# Patient Record
Sex: FEMALE | Race: ASIAN | URBAN - METROPOLITAN AREA
[De-identification: names, ages, dates, MRNs, and addresses within clinical notes are randomized per-mention and may not be internally consistent; named-entity substitution may affect disease eponyms.]

---

## 2020-08-05 ENCOUNTER — TELEPHONE ENCOUNTER (OUTPATIENT)
Dept: URBAN - METROPOLITAN AREA CLINIC 92 | Facility: CLINIC | Age: 40
End: 2020-08-05

## 2020-08-07 LAB
A/G RATIO: 1.9
ALBUMIN: 4.5
ALKALINE PHOSPHATASE: 36
ALT (SGPT): 15
AST (SGOT): 14
BILIRUBIN, TOTAL: 0.5
BUN/CREATININE RATIO: 19
BUN: 10
CALCIUM: 8.9
CARBON DIOXIDE, TOTAL: 25
CHLORIDE: 100
CHOLESTEROL, TOTAL: 184
COMMENT:: (no result)
CREATININE: 0.54
EGFR IF AFRICN AM: 139
EGFR IF NONAFRICN AM: 121
GLOBULIN, TOTAL: 2.4
GLUCOSE: 105
HDL CHOLESTEROL: 43
HEMATOCRIT: 45.6
HEMOGLOBIN: 15
LDL CHOLESTEROL CALC: 116
MCH: 29.8
MCHC: 32.9
MCV: 91
NRBC: (no result)
PLATELETS: 323
POTASSIUM: 4.3
PROTEIN, TOTAL: 6.9
RBC: 5.03
RDW: 12.6
SODIUM: 138
TRIGLYCERIDES: 123
VLDL CHOLESTEROL CAL: 25
WBC: 6.6

## 2020-08-13 ENCOUNTER — OFFICE VISIT (OUTPATIENT)
Dept: URBAN - METROPOLITAN AREA CLINIC 115 | Facility: CLINIC | Age: 40
End: 2020-08-13
Payer: OTHER GOVERNMENT

## 2020-08-13 DIAGNOSIS — K76.0 FATTY (CHANGE OF) LIVER, NOT ELSEWHERE CLASSIFIED: ICD-10-CM

## 2020-08-13 DIAGNOSIS — R74.8 ELEVATED LIVER ENZYMES: ICD-10-CM

## 2020-08-13 PROCEDURE — 99214 OFFICE O/P EST MOD 30 MIN: CPT | Performed by: INTERNAL MEDICINE

## 2020-08-13 PROCEDURE — G8427 DOCREV CUR MEDS BY ELIG CLIN: HCPCS | Performed by: INTERNAL MEDICINE

## 2020-08-13 PROCEDURE — G8418 CALC BMI BLW LOW PARAM F/U: HCPCS | Performed by: INTERNAL MEDICINE

## 2020-08-13 PROCEDURE — 1036F TOBACCO NON-USER: CPT | Performed by: INTERNAL MEDICINE

## 2020-08-13 NOTE — HPI-TODAY'S VISIT:
08/13/2020 Patient states she is doing well. Labs showed liver enzymes are normal, cholesterol is slightly high. She states she cut down on oil and has been eating better. She stays active all day, especially with her three boys. She takes Vitamin C, and would like to continue Vitamin E as it helps with varicose veins.

## 2020-08-13 NOTE — HPI-OTHER HISTORIES
The patient is a 39 year old  female, who presents on referral from Keron Hanley MD, for a gastroenterology evaluation for elevated liver enzymes. A copy of this document will be sent to the referring provider. Recent liver function tests, ALT, are elevated (see labs which were reviewed in the patients records ). The patient reports no significant symptoms related to the elevated LFT's.  The patient relates no significant family or personal history of liver disease. She states a history of laxative. The patient reports a personal history of no other habits that could cause liver damage.  Interval testing has not been done.     08/30/2018 ALT is 39, Hepatitis profile is negative. Patient does not have history of liver issues. Patient just recently had baby. Patient had C section, states she had constipation. States she took laxative. Patient admits to rash all over body. Rash is gone now. Patient occasionally drinks wine. States she is currently taking multivitamins. Denies jaundice and ascites.  10/10/2018 Patient admits to infraumbilical abdomina pain in the past believed to be related to C section. Denies current alcohol consumption. States her normal weight is around 100 pounds.  12/05/2018 Patient is doing well. 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 No jaundice or back pains.  1/31/19 Liver biopsy on 1/17/19 shows moderate fatty liver with no inflammation or fibrosis. Patient denies any new symptoms since last visit. Patient admits to weight loss of 8 pounds over past few months. Labs from last visit showed cholesterol level was normal. Denies personal history of thyroid or arthritis, no family history of autoimune diseases. Patient no longer takes vitamin supplementation. No joint pains or back pains.  8/8/2019 Labs drawn on 1/31/2019 were normal, liver enzymes are normal.  Patient denies any new symptoms. She denies any abdominal pain or bloating. She states recently she is unable to sleep at night. She does admit to stress. She has been exercising and maintaining her weight. She is currently on vitamin supplements.  02/13/2020 Patient denies any new symptoms since the previous visit. She is currently taking multivitamin and B12 supplements. She denies any new medications. Denies any abdominal pain, no jaundice, ascites or pedal edema. She does not take Vitamin E. She reports that she always eats fish in her diet. She also reports exercise by walking daily.

## 2021-01-04 ENCOUNTER — TELEPHONE ENCOUNTER (OUTPATIENT)
Dept: URBAN - METROPOLITAN AREA CLINIC 92 | Facility: CLINIC | Age: 41
End: 2021-01-04

## 2021-01-05 ENCOUNTER — LAB OUTSIDE AN ENCOUNTER (OUTPATIENT)
Dept: URBAN - METROPOLITAN AREA CLINIC 82 | Facility: CLINIC | Age: 41
End: 2021-01-05

## 2021-01-06 LAB
A/G RATIO: 1.8
ALBUMIN: 4.5
ALKALINE PHOSPHATASE: 40
ALT (SGPT): 14
AST (SGOT): 11
BILIRUBIN, TOTAL: 0.4
BUN/CREATININE RATIO: 16
BUN: 9
CALCIUM: 9.1
CARBON DIOXIDE, TOTAL: 24
CHLORIDE: 103
CHOLESTEROL, TOTAL: 167
COMMENT:: (no result)
CREATININE: 0.56
EGFR IF AFRICN AM: 137
EGFR IF NONAFRICN AM: 119
GLOBULIN, TOTAL: 2.5
GLUCOSE: 106
HDL CHOLESTEROL: 41
HEMATOCRIT: 46.6
HEMOGLOBIN: 14.6
LDL CHOL CALC (NIH): 103
MCH: 29.6
MCHC: 31.3
MCV: 94
NRBC: (no result)
PLATELETS: 357
POTASSIUM: 4.5
PROTEIN, TOTAL: 7
RBC: 4.94
RDW: 12.6
SODIUM: 138
TRIGLYCERIDES: 126
VLDL CHOLESTEROL CAL: 23
WBC: 7.3

## 2021-01-14 ENCOUNTER — WEB ENCOUNTER (OUTPATIENT)
Dept: URBAN - METROPOLITAN AREA CLINIC 115 | Facility: CLINIC | Age: 41
End: 2021-01-14

## 2021-01-14 ENCOUNTER — OFFICE VISIT (OUTPATIENT)
Dept: URBAN - METROPOLITAN AREA CLINIC 115 | Facility: CLINIC | Age: 41
End: 2021-01-14
Payer: OTHER GOVERNMENT

## 2021-01-14 DIAGNOSIS — K76.0 FATTY (CHANGE OF) LIVER, NOT ELSEWHERE CLASSIFIED: ICD-10-CM

## 2021-01-14 DIAGNOSIS — E78.2 MIXED HYPERLIPIDEMIA: ICD-10-CM

## 2021-01-14 DIAGNOSIS — R74.8 ELEVATED LIVER ENZYMES: ICD-10-CM

## 2021-01-14 PROCEDURE — G8427 DOCREV CUR MEDS BY ELIG CLIN: HCPCS | Performed by: INTERNAL MEDICINE

## 2021-01-14 PROCEDURE — G8418 CALC BMI BLW LOW PARAM F/U: HCPCS | Performed by: INTERNAL MEDICINE

## 2021-01-14 PROCEDURE — G9903 PT SCRN TBCO ID AS NON USER: HCPCS | Performed by: INTERNAL MEDICINE

## 2021-01-14 PROCEDURE — 1036F TOBACCO NON-USER: CPT | Performed by: INTERNAL MEDICINE

## 2021-01-14 PROCEDURE — 99214 OFFICE O/P EST MOD 30 MIN: CPT | Performed by: INTERNAL MEDICINE

## 2021-01-14 NOTE — HPI-TODAY'S VISIT:
08/13/2020 Patient states she is doing well. Labs showed liver enzymes are normal, cholesterol is slightly high. She states she cut down on oil and has been eating better. She stays active all day, especially with her three boys. She takes Vitamin C, and would like to continue Vitamin E as it helps with varicose veins.  01/14/2021 Patient presents for a f/u office visit. Labs show LDL cholesterol is 103, slightly high. Liver enzymes are normal. Patient states she is not experiencing abdominal pains anymore. She is also trying to exercise regularly, although quarantine has made it more difficult. No issues with heartburn or acid reflux. She is taking vitamin E, vitamin C, and multivitamin, but no other natural supplements. No hepatomegaly noted in physical examination.

## 2021-02-13 ENCOUNTER — LAB OUTSIDE AN ENCOUNTER (OUTPATIENT)
Dept: URBAN - METROPOLITAN AREA CLINIC 115 | Facility: CLINIC | Age: 41
End: 2021-02-13

## 2022-01-05 LAB
A/G RATIO: 2
ALBUMIN: 4.5
ALKALINE PHOSPHATASE: 40
ALT (SGPT): 14
AST (SGOT): 14
BILIRUBIN, TOTAL: 0.6
BUN/CREATININE RATIO: 17
BUN: 10
CALCIUM: 8.8
CARBON DIOXIDE, TOTAL: 23
CHLORIDE: 103
CHOLESTEROL, TOTAL: 196
COMMENT:: (no result)
CREATININE: 0.58
EGFR IF AFRICN AM: 134
EGFR IF NONAFRICN AM: 116
GLOBULIN, TOTAL: 2.3
GLUCOSE: 109
HDL CHOLESTEROL: 38
HEMATOCRIT: 43.6
HEMOGLOBIN: 14.6
LDL CHOL CALC (NIH): 138
MCH: 30.3
MCHC: 33.5
MCV: 91
NRBC: (no result)
PLATELETS: 328
POTASSIUM: 4.3
PROTEIN, TOTAL: 6.8
RBC: 4.82
RDW: 12.7
SODIUM: 136
TRIGLYCERIDES: 108
VLDL CHOLESTEROL CAL: 20
WBC: 4.6

## 2022-01-13 ENCOUNTER — OFFICE VISIT (OUTPATIENT)
Dept: URBAN - METROPOLITAN AREA CLINIC 115 | Facility: CLINIC | Age: 42
End: 2022-01-13
Payer: OTHER GOVERNMENT

## 2022-01-13 ENCOUNTER — WEB ENCOUNTER (OUTPATIENT)
Dept: URBAN - METROPOLITAN AREA CLINIC 115 | Facility: CLINIC | Age: 42
End: 2022-01-13

## 2022-01-13 DIAGNOSIS — E78.2 MIXED HYPERLIPIDEMIA: ICD-10-CM

## 2022-01-13 DIAGNOSIS — R74.8 ELEVATED LIVER ENZYMES: ICD-10-CM

## 2022-01-13 DIAGNOSIS — K76.0 FATTY (CHANGE OF) LIVER, NOT ELSEWHERE CLASSIFIED: ICD-10-CM

## 2022-01-13 PROCEDURE — G9903 PT SCRN TBCO ID AS NON USER: HCPCS | Performed by: INTERNAL MEDICINE

## 2022-01-13 PROCEDURE — 99214 OFFICE O/P EST MOD 30 MIN: CPT | Performed by: INTERNAL MEDICINE

## 2022-01-13 PROCEDURE — G8427 DOCREV CUR MEDS BY ELIG CLIN: HCPCS | Performed by: INTERNAL MEDICINE

## 2022-01-13 PROCEDURE — G8418 CALC BMI BLW LOW PARAM F/U: HCPCS | Performed by: INTERNAL MEDICINE

## 2022-01-13 NOTE — HPI-TODAY'S VISIT:
08/13/2020 Patient states she is doing well. Labs showed liver enzymes are normal, cholesterol is slightly high. She states she cut down on oil and has been eating better. She stays active all day, especially with her three boys. She takes Vitamin C, and would like to continue Vitamin E as it helps with varicose veins.  01/14/2021 Patient presents for a f/u office visit. Labs show LDL cholesterol is 103, slightly high. Liver enzymes are normal. Patient states she is not experiencing abdominal pains anymore. She is also trying to exercise regularly, although quarantine has made it more difficult. No issues with heartburn or acid reflux. She is taking vitamin E, vitamin C, and multivitamin, but no other natural supplements. No hepatomegaly noted in physical examination.  01/13/2022 Patient presents for follow up. Labs on 1/4/2022 showed normal liver enzymes, normal triglycerides, slightly high cholesterol. Patient denies any GI complaints for the past year. Denies any constipation, diarrhea, or bloating. She is not on any cholesterol medication. No changes to medical history since last year. No history of thyroid conditions. Patient denies any family history of cardiac pathology or hyperlipidemia.

## 2022-01-20 ENCOUNTER — OFFICE VISIT (OUTPATIENT)
Dept: URBAN - METROPOLITAN AREA CLINIC 114 | Facility: CLINIC | Age: 42
End: 2022-01-20
Payer: OTHER GOVERNMENT

## 2022-01-20 DIAGNOSIS — K76.0 HEPATIC STEATOSIS: ICD-10-CM

## 2022-01-20 PROCEDURE — 76705 ECHO EXAM OF ABDOMEN: CPT | Performed by: INTERNAL MEDICINE

## 2022-01-26 ENCOUNTER — TELEPHONE ENCOUNTER (OUTPATIENT)
Dept: URBAN - METROPOLITAN AREA CLINIC 115 | Facility: CLINIC | Age: 42
End: 2022-01-26

## 2022-03-24 ENCOUNTER — WEB ENCOUNTER (OUTPATIENT)
Dept: URBAN - METROPOLITAN AREA CLINIC 115 | Facility: CLINIC | Age: 42
End: 2022-03-24

## 2022-03-24 ENCOUNTER — OFFICE VISIT (OUTPATIENT)
Dept: URBAN - METROPOLITAN AREA CLINIC 115 | Facility: CLINIC | Age: 42
End: 2022-03-24
Payer: OTHER GOVERNMENT

## 2022-03-24 DIAGNOSIS — R74.8 ELEVATED LIVER ENZYMES: ICD-10-CM

## 2022-03-24 DIAGNOSIS — K64.8 BLEEDING INTERNAL HEMORRHOIDS: ICD-10-CM

## 2022-03-24 DIAGNOSIS — E78.2 MIXED HYPERLIPIDEMIA: ICD-10-CM

## 2022-03-24 DIAGNOSIS — K76.0 FATTY (CHANGE OF) LIVER, NOT ELSEWHERE CLASSIFIED: ICD-10-CM

## 2022-03-24 PROCEDURE — 1036F TOBACCO NON-USER: CPT | Performed by: INTERNAL MEDICINE

## 2022-03-24 PROCEDURE — G9903 PT SCRN TBCO ID AS NON USER: HCPCS | Performed by: INTERNAL MEDICINE

## 2022-03-24 PROCEDURE — 46600 DIAGNOSTIC ANOSCOPY SPX: CPT | Performed by: INTERNAL MEDICINE

## 2022-03-24 PROCEDURE — 99214 OFFICE O/P EST MOD 30 MIN: CPT | Performed by: INTERNAL MEDICINE

## 2022-03-24 PROCEDURE — G8427 DOCREV CUR MEDS BY ELIG CLIN: HCPCS | Performed by: INTERNAL MEDICINE

## 2022-03-24 RX ORDER — HYDROCORTISONE ACETATE 25 MG/1
1 SUPPOSITORY SUPPOSITORY RECTAL ONCE A DAY
Qty: 14 | Refills: 1 | OUTPATIENT
Start: 2022-03-24 | End: 2022-04-21

## 2022-03-24 RX ORDER — HYDROCORTISONE 2.5% 25 MG/G
1 APPLICATION CREAM TOPICAL TWICE A DAY
Qty: 60 GRAMS | Refills: 1 | OUTPATIENT
Start: 2022-03-24 | End: 2022-04-21

## 2022-03-24 NOTE — HPI-TODAY'S VISIT:
08/13/2020 Patient states she is doing well. Labs showed liver enzymes are normal, cholesterol is slightly high. She states she cut down on oil and has been eating better. She stays active all day, especially with her three boys. She takes Vitamin C, and would like to continue Vitamin E as it helps with varicose veins.  01/14/2021 Patient presents for a f/u office visit. Labs show LDL cholesterol is 103, slightly high. Liver enzymes are normal. Patient states she is not experiencing abdominal pains anymore. She is also trying to exercise regularly, although quarantine has made it more difficult. No issues with heartburn or acid reflux. She is taking vitamin E, vitamin C, and multivitamin, but no other natural supplements. No hepatomegaly noted in physical examination.  01/13/2022 Patient presents for follow up. Labs on 1/4/2022 showed normal liver enzymes, normal triglycerides, slightly high cholesterol. Patient denies any GI complaints for the past year. Denies any constipation, diarrhea, or bloating. She is not on any cholesterol medication. No changes to medical history since last year. No history of thyroid conditions. Patient denies any family history of cardiac pathology or hyperlipidemia.   3/24/2022 Patient presents today for GI evaluation of hemorrhoids. She reports one episode of rectal pain but denies any rectal bleeding. She admits straining during bowel movements. No known family history of colon cancer or polyps. RUQ US on 1/20/2022 showed fatty liver.

## 2022-03-26 LAB
A/G RATIO: 1.8
ALBUMIN: 4.4
ALKALINE PHOSPHATASE: 42
ALT (SGPT): 19
AST (SGOT): 18
BASO (ABSOLUTE): 0.1
BASOS: 1
BILIRUBIN, TOTAL: 0.5
BUN/CREATININE RATIO: 13
BUN: 7
CALCIUM: 8.8
CARBON DIOXIDE, TOTAL: 24
CHLORIDE: 103
CHOLESTEROL, TOTAL: 159
COMMENT:: (no result)
CREATININE: 0.53
EGFR: 121
EOS (ABSOLUTE): 0.2
EOS: 4
GLOBULIN, TOTAL: 2.5
GLUCOSE: 90
HDL CHOLESTEROL: 36
HEMATOCRIT: 45.3
HEMATOLOGY COMMENTS:: (no result)
HEMOGLOBIN: 14.9
IMMATURE CELLS: (no result)
IMMATURE GRANS (ABS): 0
IMMATURE GRANULOCYTES: 1
LDL CHOL CALC (NIH): 105
LYMPHS (ABSOLUTE): 0.9
LYMPHS: 21
MCH: 30
MCHC: 32.9
MCV: 91
MONOCYTES(ABSOLUTE): 0.5
MONOCYTES: 12
NEUTROPHILS (ABSOLUTE): 2.6
NEUTROPHILS: 61
NRBC: (no result)
PLATELETS: 328
POTASSIUM: 4.5
PROTEIN, TOTAL: 6.9
RBC: 4.96
RDW: 12.5
SODIUM: 139
TRIGLYCERIDES: 96
VLDL CHOLESTEROL CAL: 18
WBC: 4.2

## 2022-04-01 ENCOUNTER — TELEPHONE ENCOUNTER (OUTPATIENT)
Dept: URBAN - METROPOLITAN AREA CLINIC 115 | Facility: CLINIC | Age: 42
End: 2022-04-01

## 2022-05-06 ENCOUNTER — TELEPHONE ENCOUNTER (OUTPATIENT)
Dept: URBAN - METROPOLITAN AREA CLINIC 115 | Facility: CLINIC | Age: 42
End: 2022-05-06

## 2022-05-26 ENCOUNTER — OFFICE VISIT (OUTPATIENT)
Dept: URBAN - METROPOLITAN AREA CLINIC 115 | Facility: CLINIC | Age: 42
End: 2022-05-26
Payer: OTHER GOVERNMENT

## 2022-05-26 DIAGNOSIS — R74.8 ELEVATED LIVER ENZYMES: ICD-10-CM

## 2022-05-26 DIAGNOSIS — E78.2 MIXED HYPERLIPIDEMIA: ICD-10-CM

## 2022-05-26 DIAGNOSIS — K76.0 FATTY (CHANGE OF) LIVER, NOT ELSEWHERE CLASSIFIED: ICD-10-CM

## 2022-05-26 DIAGNOSIS — K64.9 HEMORRHOIDS: ICD-10-CM

## 2022-05-26 PROCEDURE — G8418 CALC BMI BLW LOW PARAM F/U: HCPCS | Performed by: INTERNAL MEDICINE

## 2022-05-26 PROCEDURE — G9903 PT SCRN TBCO ID AS NON USER: HCPCS | Performed by: INTERNAL MEDICINE

## 2022-05-26 PROCEDURE — 99214 OFFICE O/P EST MOD 30 MIN: CPT | Performed by: INTERNAL MEDICINE

## 2022-05-26 PROCEDURE — G8427 DOCREV CUR MEDS BY ELIG CLIN: HCPCS | Performed by: INTERNAL MEDICINE

## 2022-05-26 NOTE — PHYSICAL EXAM SKIN:
rashes noted on arms and legs, no areas of discoloration , no jaundice present , good turgor , no masses , no tenderness on palpation

## 2022-05-26 NOTE — HPI-TODAY'S VISIT:
08/13/2020 Patient states she is doing well. Labs showed liver enzymes are normal, cholesterol is slightly high. She states she cut down on oil and has been eating better. She stays active all day, especially with her three boys. She takes Vitamin C, and would like to continue Vitamin E as it helps with varicose veins.  01/14/2021 Patient presents for a f/u office visit. Labs show LDL cholesterol is 103, slightly high. Liver enzymes are normal. Patient states she is not experiencing abdominal pains anymore. She is also trying to exercise regularly, although quarantine has made it more difficult. No issues with heartburn or acid reflux. She is taking vitamin E, vitamin C, and multivitamin, but no other natural supplements. No hepatomegaly noted in physical examination.  01/13/2022 Patient presents for follow up. Labs on 1/4/2022 showed normal liver enzymes, normal triglycerides, slightly high cholesterol. Patient denies any GI complaints for the past year. Denies any constipation, diarrhea, or bloating. She is not on any cholesterol medication. No changes to medical history since last year. No history of thyroid conditions. Patient denies any family history of cardiac pathology or hyperlipidemia.   3/24/2022 Patient presents today for GI evaluation of hemorrhoids. She reports one episode of rectal pain but denies any rectal bleeding. She admits straining during bowel movements. No known family history of colon cancer or polyps. RUQ US on 1/20/2022 showed fatty liver.  5/26/2022 Patient presents for a follow up office visit. Labs on 3/25/2022 showed mildly low HDL cholesterol, mildly elevated LDL cholesterol, normal AST and ALT. Patient denies any active GI symptoms. She denies any alcohol consumption. She does report skin rash on arms and legs from poison ivy. She is taking ointment from her PCP Dr. Hanley.

## 2022-05-27 LAB
A/G RATIO: 1.9
ALBUMIN: 4.5
ALKALINE PHOSPHATASE: 42
ALT (SGPT): 16
AST (SGOT): 14
BASO (ABSOLUTE): 0.1
BASOS: 2
BILIRUBIN, TOTAL: 0.7
BUN/CREATININE RATIO: 18
BUN: 10
CALCIUM: 9
CARBON DIOXIDE, TOTAL: 22
CHLORIDE: 105
CHOLESTEROL, TOTAL: 155
COMMENT:: (no result)
CREATININE: 0.57
EGFR: 118
EOS (ABSOLUTE): 0.5
EOS: 8
GLOBULIN, TOTAL: 2.4
GLUCOSE: 97
HDL CHOLESTEROL: 43
HEMATOCRIT: 46
HEMATOLOGY COMMENTS:: (no result)
HEMOGLOBIN: 14.5
IMMATURE CELLS: (no result)
IMMATURE GRANS (ABS): 0.1
IMMATURE GRANULOCYTES: 1
LDL CHOL CALC (NIH): 102
LYMPHS (ABSOLUTE): 1.2
LYMPHS: 20
MCH: 28.9
MCHC: 31.5
MCV: 92
MONOCYTES(ABSOLUTE): 0.6
MONOCYTES: 9
NEUTROPHILS (ABSOLUTE): 3.8
NEUTROPHILS: 60
NRBC: (no result)
PLATELETS: 339
POTASSIUM: 4.1
PROTEIN, TOTAL: 6.9
RBC: 5.01
RDW: 13
SODIUM: 140
TRIGLYCERIDES: 49
VLDL CHOLESTEROL CAL: 10
WBC: 6.2

## 2022-07-01 ENCOUNTER — LAB OUTSIDE AN ENCOUNTER (OUTPATIENT)
Dept: URBAN - METROPOLITAN AREA CLINIC 115 | Facility: CLINIC | Age: 42
End: 2022-07-01

## 2022-07-13 ENCOUNTER — LAB OUTSIDE AN ENCOUNTER (OUTPATIENT)
Dept: URBAN - METROPOLITAN AREA CLINIC 115 | Facility: CLINIC | Age: 42
End: 2022-07-13

## 2022-11-03 ENCOUNTER — OFFICE VISIT (OUTPATIENT)
Dept: URBAN - METROPOLITAN AREA CLINIC 115 | Facility: CLINIC | Age: 42
End: 2022-11-03
Payer: OTHER GOVERNMENT

## 2022-11-03 VITALS
HEIGHT: 60 IN | DIASTOLIC BLOOD PRESSURE: 54 MMHG | TEMPERATURE: 98 F | SYSTOLIC BLOOD PRESSURE: 101 MMHG | WEIGHT: 107 LBS | BODY MASS INDEX: 21.01 KG/M2 | HEART RATE: 76 BPM

## 2022-11-03 DIAGNOSIS — K64.9 HEMORRHOIDS: ICD-10-CM

## 2022-11-03 DIAGNOSIS — K76.0 FATTY (CHANGE OF) LIVER, NOT ELSEWHERE CLASSIFIED: ICD-10-CM

## 2022-11-03 DIAGNOSIS — E78.2 MIXED HYPERLIPIDEMIA: ICD-10-CM

## 2022-11-03 DIAGNOSIS — R74.8 ELEVATED LIVER ENZYMES: ICD-10-CM

## 2022-11-03 PROCEDURE — G9903 PT SCRN TBCO ID AS NON USER: HCPCS | Performed by: INTERNAL MEDICINE

## 2022-11-03 PROCEDURE — 99214 OFFICE O/P EST MOD 30 MIN: CPT | Performed by: INTERNAL MEDICINE

## 2022-11-03 PROCEDURE — G8418 CALC BMI BLW LOW PARAM F/U: HCPCS | Performed by: INTERNAL MEDICINE

## 2022-11-03 PROCEDURE — G8427 DOCREV CUR MEDS BY ELIG CLIN: HCPCS | Performed by: INTERNAL MEDICINE

## 2022-11-03 NOTE — HPI-TODAY'S VISIT:
08/13/2020 Patient states she is doing well. Labs showed liver enzymes are normal, cholesterol is slightly high. She states she cut down on oil and has been eating better. She stays active all day, especially with her three boys. She takes Vitamin C, and would like to continue Vitamin E as it helps with varicose veins.  01/14/2021 Patient presents for a f/u office visit. Labs show LDL cholesterol is 103, slightly high. Liver enzymes are normal. Patient states she is not experiencing abdominal pains anymore. She is also trying to exercise regularly, although quarantine has made it more difficult. No issues with heartburn or acid reflux. She is taking vitamin E, vitamin C, and multivitamin, but no other natural supplements. No hepatomegaly noted in physical examination.  01/13/2022 Patient presents for follow up. Labs on 1/4/2022 showed normal liver enzymes, normal triglycerides, slightly high cholesterol. Patient denies any GI complaints for the past year. Denies any constipation, diarrhea, or bloating. She is not on any cholesterol medication. No changes to medical history since last year. No history of thyroid conditions. Patient denies any family history of cardiac pathology or hyperlipidemia.   3/24/2022 Patient presents today for GI evaluation of hemorrhoids. She reports one episode of rectal pain but denies any rectal bleeding. She admits straining during bowel movements. No known family history of colon cancer or polyps. RUQ US on 1/20/2022 showed fatty liver.  5/26/2022 Patient presents for a follow up office visit. Labs on 3/25/2022 showed mildly low HDL cholesterol, mildly elevated LDL cholesterol, normal AST and ALT. Patient denies any active GI symptoms. She denies any alcohol consumption. She does report skin rash on arms and legs from poison ivy. She is taking ointment from her PCP Dr. Hanley.  11/3/2022 Patient presents for fatty liver and hemorrhoids. Patient denies any new symptoms or issues. She denies any new issues with her hemorrhoids. She states that currently she is only on vitamin E. Patient states that there is no change in her medical history.

## 2022-11-04 LAB
A/G RATIO: 2
ABSOLUTE BASOPHILS: 74
ABSOLUTE EOSINOPHILS: 85
ABSOLUTE LYMPHOCYTES: 1283
ABSOLUTE MONOCYTES: 636
ABSOLUTE NEUTROPHILS: 8522
ALBUMIN: 4.5
ALKALINE PHOSPHATASE: 40
ALT (SGPT): 18
AST (SGOT): 15
BASOPHILS: 0.7
BILIRUBIN, TOTAL: 0.8
BUN/CREATININE RATIO: (no result)
BUN: 10
CALCIUM: 8.7
CARBON DIOXIDE, TOTAL: 26
CERULOPLASMIN: 21
CHLORIDE: 102
CHOL/HDLC RATIO: 3.6
CHOLESTEROL, TOTAL: 186
CREATININE: 0.54
EGFR: 120
EOSINOPHILS: 0.8
GLOBULIN, TOTAL: 2.3
GLUCOSE: 79
HDL CHOLESTEROL: 52
HEMATOCRIT: 42.7
HEMOGLOBIN: 14.2
LDL CHOLESTEROL CALC: 121
LYMPHOCYTES: 12.1
MCH: 30
MCHC: 33.3
MCV: 90.1
MONOCYTES: 6
MPV: 10.1
NEUTROPHILS: 80.4
NON HDL CHOLESTEROL: 134
PLATELET COUNT: 321
POTASSIUM: 3.9
PROTEIN, TOTAL: 6.8
RDW: 12.7
RED BLOOD CELL COUNT: 4.74
SODIUM: 137
TRIGLYCERIDES: 42
WHITE BLOOD CELL COUNT: 10.6

## 2023-01-13 ENCOUNTER — LAB OUTSIDE AN ENCOUNTER (OUTPATIENT)
Dept: URBAN - METROPOLITAN AREA CLINIC 115 | Facility: CLINIC | Age: 43
End: 2023-01-13

## 2023-05-04 ENCOUNTER — OFFICE VISIT (OUTPATIENT)
Dept: URBAN - METROPOLITAN AREA CLINIC 115 | Facility: CLINIC | Age: 43
End: 2023-05-04
Payer: OTHER GOVERNMENT

## 2023-05-04 ENCOUNTER — LAB OUTSIDE AN ENCOUNTER (OUTPATIENT)
Dept: URBAN - METROPOLITAN AREA CLINIC 115 | Facility: CLINIC | Age: 43
End: 2023-05-04

## 2023-05-04 VITALS
BODY MASS INDEX: 20.14 KG/M2 | HEIGHT: 60 IN | WEIGHT: 102.6 LBS | DIASTOLIC BLOOD PRESSURE: 70 MMHG | SYSTOLIC BLOOD PRESSURE: 108 MMHG | TEMPERATURE: 98 F | HEART RATE: 66 BPM

## 2023-05-04 DIAGNOSIS — K76.0 FATTY (CHANGE OF) LIVER, NOT ELSEWHERE CLASSIFIED: ICD-10-CM

## 2023-05-04 DIAGNOSIS — K64.9 HEMORRHOIDS: ICD-10-CM

## 2023-05-04 DIAGNOSIS — E78.2 MIXED HYPERLIPIDEMIA: ICD-10-CM

## 2023-05-04 DIAGNOSIS — R74.8 ELEVATED LIVER ENZYMES: ICD-10-CM

## 2023-05-04 PROBLEM — 267434003: Status: ACTIVE | Noted: 2021-01-14

## 2023-05-04 PROBLEM — 197321007: Status: ACTIVE | Noted: 2020-08-05

## 2023-05-04 PROCEDURE — G9903 PT SCRN TBCO ID AS NON USER: HCPCS | Performed by: INTERNAL MEDICINE

## 2023-05-04 PROCEDURE — 99214 OFFICE O/P EST MOD 30 MIN: CPT | Performed by: INTERNAL MEDICINE

## 2023-05-04 PROCEDURE — G8427 DOCREV CUR MEDS BY ELIG CLIN: HCPCS | Performed by: INTERNAL MEDICINE

## 2023-05-04 PROCEDURE — G8418 CALC BMI BLW LOW PARAM F/U: HCPCS | Performed by: INTERNAL MEDICINE

## 2023-05-04 NOTE — HPI-TODAY'S VISIT:
08/13/2020 Patient states she is doing well. Labs showed liver enzymes are normal, cholesterol is slightly high. She states she cut down on oil and has been eating better. She stays active all day, especially with her three boys. She takes Vitamin C, and would like to continue Vitamin E as it helps with varicose veins.  01/14/2021 Patient presents for a f/u office visit. Labs show LDL cholesterol is 103, slightly high. Liver enzymes are normal. Patient states she is not experiencing abdominal pains anymore. She is also trying to exercise regularly, although quarantine has made it more difficult. No issues with heartburn or acid reflux. She is taking vitamin E, vitamin C, and multivitamin, but no other natural supplements. No hepatomegaly noted in physical examination.  01/13/2022 Patient presents for follow up. Labs on 1/4/2022 showed normal liver enzymes, normal triglycerides, slightly high cholesterol. Patient denies any GI complaints for the past year. Denies any constipation, diarrhea, or bloating. She is not on any cholesterol medication. No changes to medical history since last year. No history of thyroid conditions. Patient denies any family history of cardiac pathology or hyperlipidemia.   3/24/2022 Patient presents today for GI evaluation of hemorrhoids. She reports one episode of rectal pain but denies any rectal bleeding. She admits straining during bowel movements. No known family history of colon cancer or polyps. RUQ US on 1/20/2022 showed fatty liver.  5/26/2022 Patient presents for a follow up office visit. Labs on 3/25/2022 showed mildly low HDL cholesterol, mildly elevated LDL cholesterol, normal AST and ALT. Patient denies any active GI symptoms. She denies any alcohol consumption. She does report skin rash on arms and legs from poison ivy. She is taking ointment from her PCP Dr. Hanley.  11/3/2022 Patient presents for fatty liver and hemorrhoids. Patient denies any new symptoms or issues. She denies any new issues with her hemorrhoids. She states that currently she is only on vitamin E. Patient states that there is no change in her medical history.  5/4/2023 Patient presents for a 6 month follow up. Patient denies any new symptoms. She states that she only takes a multivitamin and no other medication. She denies any other change to her medical history. Patient states that she has not been gaining weight and has been losing weight.

## 2023-05-05 LAB
A/G RATIO: 1.8
ALBUMIN: 4.6
ALKALINE PHOSPHATASE: 39
ALT (SGPT): 14
AST (SGOT): 13
BILIRUBIN, TOTAL: 0.7
BUN/CREATININE RATIO: (no result)
BUN: 8
CALCIUM: 9.1
CARBON DIOXIDE, TOTAL: 24
CHLORIDE: 106
CREATININE: 0.5
EGFR: 122
GLOBULIN, TOTAL: 2.5
GLUCOSE: 91
POTASSIUM: 4.5
PROTEIN, TOTAL: 7.1
SODIUM: 139

## 2023-05-08 ENCOUNTER — OFFICE VISIT (OUTPATIENT)
Dept: URBAN - METROPOLITAN AREA CLINIC 114 | Facility: CLINIC | Age: 43
End: 2023-05-08
Payer: OTHER GOVERNMENT

## 2023-05-08 DIAGNOSIS — R74.8 ELEVATED LIVER ENZYMES: ICD-10-CM

## 2023-05-08 PROCEDURE — 76705 ECHO EXAM OF ABDOMEN: CPT | Performed by: INTERNAL MEDICINE

## 2023-05-09 LAB
ABSOLUTE BASOPHILS: 68
ABSOLUTE EOSINOPHILS: 117
ABSOLUTE LYMPHOCYTES: 1427
ABSOLUTE MONOCYTES: 347
ABSOLUTE NEUTROPHILS: 2543
ALBUMIN: 4.6
ALT: 14
AST: 13
BASOPHILS: 1.5
CALCULATED BMI: 19.8
CHOL/HDLC RATIO: 4
CHOLESTEROL, TOTAL: 178
DIABETES: NO
EOSINOPHILS: 2.6
GLUCOSE, SERUM: 91
HDL CHOLESTEROL: 44
HEIGHT FEET: 1
HEMATOCRIT: 42.6
HEMOGLOBIN: 14.7
INTERPRETATION: (no result)
LDL CHOLESTEROL CALC: 115
LYMPHOCYTES: 31.7
MCH: 30.3
MCHC: 34.5
MCV: 87.8
MONOCYTES: 7.7
MPV: 9.7
NAFLD FIBROSIS SCORE: -5.05
NEUTROPHILS: 56.5
NON HDL CHOLESTEROL: 134
PLATELET COUNT: 354
PLATELET COUNT: 354
RDW: 13
RED BLOOD CELL COUNT: 4.85
TRIGLYCERIDES: 88
WEIGHT: 105
WHITE BLOOD CELL COUNT: 4.5

## 2024-05-09 ENCOUNTER — OFFICE VISIT (OUTPATIENT)
Dept: URBAN - METROPOLITAN AREA CLINIC 115 | Facility: CLINIC | Age: 44
End: 2024-05-09

## 2024-05-09 ENCOUNTER — TELEPHONE ENCOUNTER (OUTPATIENT)
Dept: URBAN - METROPOLITAN AREA CLINIC 115 | Facility: CLINIC | Age: 44
End: 2024-05-09

## 2024-05-16 ENCOUNTER — DASHBOARD ENCOUNTERS (OUTPATIENT)
Age: 44
End: 2024-05-16

## 2024-05-16 ENCOUNTER — OFFICE VISIT (OUTPATIENT)
Dept: URBAN - METROPOLITAN AREA CLINIC 115 | Facility: CLINIC | Age: 44
End: 2024-05-16
Payer: OTHER GOVERNMENT

## 2024-05-16 VITALS
HEIGHT: 60 IN | HEART RATE: 94 BPM | WEIGHT: 108 LBS | BODY MASS INDEX: 21.2 KG/M2 | SYSTOLIC BLOOD PRESSURE: 103 MMHG | TEMPERATURE: 97.1 F | DIASTOLIC BLOOD PRESSURE: 62 MMHG

## 2024-05-16 DIAGNOSIS — E78.2 MIXED HYPERLIPIDEMIA: ICD-10-CM

## 2024-05-16 DIAGNOSIS — K76.0 FATTY (CHANGE OF) LIVER, NOT ELSEWHERE CLASSIFIED: ICD-10-CM

## 2024-05-16 DIAGNOSIS — K64.9 HEMORRHOIDS: ICD-10-CM

## 2024-05-16 DIAGNOSIS — R74.8 ELEVATED LIVER ENZYMES: ICD-10-CM

## 2024-05-16 LAB
ABSOLUTE BASOPHILS: 59
ABSOLUTE EOSINOPHILS: 119
ABSOLUTE LYMPHOCYTES: 1463
ABSOLUTE MONOCYTES: 427
ABSOLUTE NEUTROPHILS: 3332
ALBUMIN/GLOBULIN RATIO: 1.9
ALBUMIN: 4.8
ALKALINE PHOSPHATASE: 39
ALT: 17
AST: 15
BASOPHILS: 1.1
BILIRUBIN, TOTAL: 0.6
BUN/CREATININE RATIO: (no result)
CALCIUM: 9.2
CARBON DIOXIDE: 26
CHLORIDE: 103
CHOL/HDLC RATIO: 3.8
CHOLESTEROL, TOTAL: 181
CREATININE: 0.58
EGFR: 117
EOSINOPHILS: 2.2
FIB 4 INDEX: 0.43
FIB 4 INTERPRETATION: (no result)
GLOBULIN: 2.5
GLUCOSE: 99
HDL CHOLESTEROL: 48
HEMATOCRIT: 45.5
HEMOGLOBIN: 14.8
LDL CHOLESTEROL CALC: 109
LYMPHOCYTES: 27.1
MCH: 30
MCHC: 32.5
MCV: 92.3
MONOCYTES: 7.9
MPV: 10
NEUTROPHILS: 61.7
NON HDL CHOLESTEROL: 133
PLATELET COUNT: 349
PLATELET COUNT: 349
POTASSIUM: 4.2
PROTEIN, TOTAL: 7.3
RDW: 13.4
RED BLOOD CELL COUNT: 4.93
SODIUM: 137
TRIGLYCERIDES: 125
UREA NITROGEN (BUN): 10
WHITE BLOOD CELL COUNT: 5.4

## 2024-05-16 PROCEDURE — 99214 OFFICE O/P EST MOD 30 MIN: CPT | Performed by: INTERNAL MEDICINE

## 2024-11-16 ENCOUNTER — LAB OUTSIDE AN ENCOUNTER (OUTPATIENT)
Dept: URBAN - METROPOLITAN AREA CLINIC 115 | Facility: CLINIC | Age: 44
End: 2024-11-16

## 2024-11-21 ENCOUNTER — OFFICE VISIT (OUTPATIENT)
Dept: URBAN - METROPOLITAN AREA CLINIC 115 | Facility: CLINIC | Age: 44
End: 2024-11-21
Payer: OTHER GOVERNMENT

## 2024-11-21 VITALS
BODY MASS INDEX: 21.36 KG/M2 | SYSTOLIC BLOOD PRESSURE: 102 MMHG | HEIGHT: 60 IN | WEIGHT: 108.8 LBS | HEART RATE: 80 BPM | TEMPERATURE: 98.4 F | DIASTOLIC BLOOD PRESSURE: 62 MMHG

## 2024-11-21 DIAGNOSIS — K64.9 HEMORRHOIDS: ICD-10-CM

## 2024-11-21 DIAGNOSIS — E78.2 MIXED HYPERLIPIDEMIA: ICD-10-CM

## 2024-11-21 DIAGNOSIS — K76.0 FATTY (CHANGE OF) LIVER, NOT ELSEWHERE CLASSIFIED: ICD-10-CM

## 2024-11-21 DIAGNOSIS — R74.8 ELEVATED LIVER ENZYMES: ICD-10-CM

## 2024-11-21 PROCEDURE — 99214 OFFICE O/P EST MOD 30 MIN: CPT | Performed by: INTERNAL MEDICINE

## 2024-11-21 NOTE — HPI-TODAY'S VISIT:
08/13/2020 Patient states she is doing well. Labs showed liver enzymes are normal, cholesterol is slightly high. She states she cut down on oil and has been eating better. She stays active all day, especially with her three boys. She takes Vitamin C, and would like to continue Vitamin E as it helps with varicose veins.  01/14/2021 Patient presents for a f/u office visit. Labs show LDL cholesterol is 103, slightly high. Liver enzymes are normal. Patient states she is not experiencing abdominal pains anymore. She is also trying to exercise regularly, although quarantine has made it more difficult. No issues with heartburn or acid reflux. She is taking vitamin E, vitamin C, and multivitamin, but no other natural supplements. No hepatomegaly noted in physical examination.  01/13/2022 Patient presents for follow up. Labs on 1/4/2022 showed normal liver enzymes, normal triglycerides, slightly high cholesterol. Patient denies any GI complaints for the past year. Denies any constipation, diarrhea, or bloating. She is not on any cholesterol medication. No changes to medical history since last year. No history of thyroid conditions. Patient denies any family history of cardiac pathology or hyperlipidemia.   3/24/2022 Patient presents today for GI evaluation of hemorrhoids. She reports one episode of rectal pain but denies any rectal bleeding. She admits straining during bowel movements. No known family history of colon cancer or polyps. RUQ US on 1/20/2022 showed fatty liver.  5/26/2022 Patient presents for a follow up office visit. Labs on 3/25/2022 showed mildly low HDL cholesterol, mildly elevated LDL cholesterol, normal AST and ALT. Patient denies any active GI symptoms. She denies any alcohol consumption. She does report skin rash on arms and legs from poison ivy. She is taking ointment from her PCP Dr. Hanley.  11/3/2022 Patient presents for fatty liver and hemorrhoids. Patient denies any new symptoms or issues. She denies any new issues with her hemorrhoids. She states that currently she is only on vitamin E. Patient states that there is no change in her medical history.  5/4/2023 Patient presents for a 6 month follow up. Patient denies any new symptoms. She states that only takes a multivitamin and no other medication. She denies any other change to her medical history. Patient states that she has not been gaining weight and has been losing weight.  5/16/2024 Patient presents for a follow up. Patient deneis any symptoms or issues since the last time she was here. She states that sometimes she has some lower left abdominal pain. She denies heartburn and acid reflux. Patient denies any changes to her medical history.  11/21/2024 Patient presents for 6 month follow up. Patient denies any symptoms since the last time she was here. She says she only takes multivitamins. She deniees heartburn and GERD.

## 2024-11-22 LAB
ABSOLUTE BASOPHILS: 61
ABSOLUTE EOSINOPHILS: 117
ABSOLUTE LYMPHOCYTES: 1428
ABSOLUTE MONOCYTES: 367
ABSOLUTE NEUTROPHILS: 3126
ALBUMIN/GLOBULIN RATIO: 1.9
ALBUMIN: 4.5
ALKALINE PHOSPHATASE: 40
ALT: 24
AST: 19
BASOPHILS: 1.2
BILIRUBIN, TOTAL: 0.7
BUN/CREATININE RATIO: (no result)
CALCIUM: 9.2
CARBON DIOXIDE: 27
CHLORIDE: 104
CHOL/HDLC RATIO: 3.5
CHOLESTEROL, TOTAL: 187
CREATININE: 0.54
EGFR: 119
EOSINOPHILS: 2.3
FIB 4 INDEX: 0.48
GLOBULIN: 2.4
GLUCOSE: 93
HDL CHOLESTEROL: 54
HEMATOCRIT: 43.3
HEMOGLOBIN: 14.2
LDL CHOLESTEROL CALC: 114
LYMPHOCYTES: 28
MCH: 30.1
MCHC: 32.8
MCV: 91.7
MONOCYTES: 7.2
MPV: 10
NEUTROPHILS: 61.3
NON HDL CHOLESTEROL: 133
PLATELET COUNT: 333
PLATELET COUNT: 333
POTASSIUM: 4
PROTEIN, TOTAL: 6.9
RDW: 13
RED BLOOD CELL COUNT: 4.72
SODIUM: 138
TRIGLYCERIDES: 86
UREA NITROGEN (BUN): 10
WHITE BLOOD CELL COUNT: 5.1

## 2024-12-19 ENCOUNTER — OFFICE VISIT (OUTPATIENT)
Dept: URBAN - METROPOLITAN AREA CLINIC 114 | Facility: CLINIC | Age: 44
End: 2024-12-19
Payer: OTHER GOVERNMENT

## 2024-12-19 DIAGNOSIS — K76.0 FATTY (CHANGE OF) LIVER: ICD-10-CM

## 2024-12-19 PROCEDURE — 76705 ECHO EXAM OF ABDOMEN: CPT | Performed by: INTERNAL MEDICINE

## 2025-05-14 ENCOUNTER — TELEPHONE ENCOUNTER (OUTPATIENT)
Dept: URBAN - METROPOLITAN AREA CLINIC 115 | Facility: CLINIC | Age: 45
End: 2025-05-14

## 2025-05-21 ENCOUNTER — LAB OUTSIDE AN ENCOUNTER (OUTPATIENT)
Dept: URBAN - METROPOLITAN AREA CLINIC 115 | Facility: CLINIC | Age: 45
End: 2025-05-21

## 2025-06-26 ENCOUNTER — OFFICE VISIT (OUTPATIENT)
Dept: URBAN - METROPOLITAN AREA CLINIC 115 | Facility: CLINIC | Age: 45
End: 2025-06-26
Payer: OTHER GOVERNMENT

## 2025-06-26 DIAGNOSIS — K76.0 FATTY (CHANGE OF) LIVER, NOT ELSEWHERE CLASSIFIED: ICD-10-CM

## 2025-06-26 DIAGNOSIS — E78.2 MIXED HYPERLIPIDEMIA: ICD-10-CM

## 2025-06-26 DIAGNOSIS — K64.9 HEMORRHOIDS: ICD-10-CM

## 2025-06-26 DIAGNOSIS — R74.8 ELEVATED LIVER ENZYMES: ICD-10-CM

## 2025-06-26 PROCEDURE — 99214 OFFICE O/P EST MOD 30 MIN: CPT | Performed by: INTERNAL MEDICINE

## 2025-06-26 NOTE — HPI-TODAY'S VISIT:
08/13/2020 Patient states she is doing well. Labs showed liver enzymes are normal, cholesterol is slightly high. She states she cut down on oil and has been eating better. She stays active all day, especially with her three boys. She takes Vitamin C, and would like to continue Vitamin E as it helps with varicose veins.  01/14/2021 Patient presents for a f/u office visit. Labs show LDL cholesterol is 103, slightly high. Liver enzymes are normal. Patient states she is not experiencing abdominal pains anymore. She is also trying to exercise regularly, although quarantine has made it more difficult. No issues with heartburn or acid reflux. She is taking vitamin E, vitamin C, and multivitamin, but no other natural supplements. No hepatomegaly noted in physical examination.  01/13/2022 Patient presents for follow up. Labs on 1/4/2022 showed normal liver enzymes, normal triglycerides, slightly high cholesterol. Patient denies any GI complaints for the past year. Denies any constipation, diarrhea, or bloating. She is not on any cholesterol medication. No changes to medical history since last year. No history of thyroid conditions. Patient denies any family history of cardiac pathology or hyperlipidemia.   3/24/2022 Patient presents today for GI evaluation of hemorrhoids. She reports one episode of rectal pain but denies any rectal bleeding. She admits straining during bowel movements. No known family history of colon cancer or polyps. RUQ US on 1/20/2022 showed fatty liver.  5/26/2022 Patient presents for a follow up office visit. Labs on 3/25/2022 showed mildly low HDL cholesterol, mildly elevated LDL cholesterol, normal AST and ALT. Patient denies any active GI symptoms. She denies any alcohol consumption. She does report skin rash on arms and legs from poison ivy. She is taking ointment from her PCP Dr. Hanley.  11/3/2022 Patient presents for fatty liver and hemorrhoids. Patient denies any new symptoms or issues. She denies any new issues with her hemorrhoids. She states that currently she is only on vitamin E. Patient states that there is no change in her medical history.  5/4/2023 Patient presents for a 6 month follow up. Patient denies any new symptoms. She states that only takes a multivitamin and no other medication. She denies any other change to her medical history. Patient states that she has not been gaining weight and has been losing weight.  5/16/2024 Patient presents for a follow up. Patient deneis any symptoms or issues since the last time she was here. She states that sometimes she has some lower left abdominal pain. She denies heartburn and acid reflux. Patient denies any changes to her medical history.  11/21/2024 Patient presents for 6 month follow up. Patient denies any symptoms since the last time she was here. She says she only takes multivitamins. She deniees heartburn and GERD.  6/26/2025 Patient 6 month follow up. Patient had fatty liver in the past. Her labs are good and and showed low cholestrerol. She mentions no new medication. Patient mentions no new changes to medical history. She is not currently taking cholesterol medication. She mentions visiting her PCP, Dr. Galazra, and was told her cholesterol is slighty high, but is doing alright. Denies abdominal pain, bloating, heartburn, and acid reflux. She denies thyroid issues.